# Patient Record
Sex: FEMALE | Race: WHITE | NOT HISPANIC OR LATINO | Employment: OTHER | URBAN - METROPOLITAN AREA
[De-identification: names, ages, dates, MRNs, and addresses within clinical notes are randomized per-mention and may not be internally consistent; named-entity substitution may affect disease eponyms.]

---

## 2018-10-11 ENCOUNTER — HOSPITAL ENCOUNTER (OUTPATIENT)
Dept: ORTHOPEDIC SURGERY | Facility: CLINIC | Age: 73
Discharge: HOME | End: 2018-10-11
Attending: ORTHOPAEDIC SURGERY | Admitting: ORTHOPAEDIC SURGERY

## 2020-07-02 ENCOUNTER — OFFICE VISIT (OUTPATIENT)
Dept: ORTHOPEDIC SURGERY | Facility: CLINIC | Age: 75
End: 2020-07-02

## 2020-07-02 VITALS
WEIGHT: 140 LBS | SYSTOLIC BLOOD PRESSURE: 146 MMHG | HEIGHT: 65 IN | HEART RATE: 81 BPM | DIASTOLIC BLOOD PRESSURE: 78 MMHG | BODY MASS INDEX: 23.32 KG/M2

## 2020-07-02 DIAGNOSIS — M25.512 ACUTE PAIN OF LEFT SHOULDER: Primary | ICD-10-CM

## 2020-07-02 PROCEDURE — 99213 OFFICE O/P EST LOW 20 MIN: CPT | Performed by: ORTHOPAEDIC SURGERY

## 2020-07-02 RX ORDER — MULTIPLE VITAMINS W/ MINERALS TAB 9MG-400MCG
1 TAB ORAL DAILY
COMMUNITY

## 2020-07-02 RX ORDER — NICOTINE POLACRILEX 4 MG/1
GUM, CHEWING ORAL
COMMUNITY
Start: 2018-10-11

## 2020-07-02 NOTE — PROGRESS NOTES
"     Patient ID: Ni Vance is a 75 y.o. female.    Chief Complaint:    Chief Complaint   Patient presents with   • Left Shoulder - Consult       HPI:  Ni is a 75-year-old female here with several months of left shoulder pain.  She denies specific injury but has developed significant pain and loss of range of motion in the shoulder.  Pain is deep in the shoulder.  It hurts to lift overhead and sleep at night.  She has tried to stretch it, use ice and anti-inflammatories without relief.  Pain is sharp and a 7/10  Past Medical History:   Diagnosis Date   • Allergies    • Asthma    • Kidney stones        Past Surgical History:   Procedure Laterality Date   • CHOLECYSTECTOMY     • TONSILLECTOMY         History reviewed. No pertinent family history.       Social History     Occupational History   • Not on file   Tobacco Use   • Smoking status: Never Smoker   Substance and Sexual Activity   • Alcohol use: Never     Frequency: Never   • Drug use: Not on file   • Sexual activity: Not on file      Review of Systems   Cardiovascular: Negative for chest pain.   Musculoskeletal: Positive for arthralgias.       Objective:    /78   Pulse 81   Ht 165.1 cm (65\")   Wt 63.5 kg (140 lb)   BMI 23.30 kg/m²     Physical Examination:  She is a pleasant female in no distress. She is alert and oriented x3 and appears her stated age.  Left shoulder demonstrates no scars and no atrophy.  There is diffuse tenderness over the bicep groove and impingement area.  Passive elevation is 110 degrees abduction 60 degrees external rotation 20 degrees internal rotation is the left hip.  She has pain and weakness on Speed, Adria, supraspinatus testing.  Belly press and liftoff are 5/5.Sensory and motor exam are intact all distributions. Radial pulse is palpable and capillary refill is less than two seconds to all digits    Imaging:  X-rays demonstrate well-maintained joint space with sclerosis at the coracoid base    Assessment:  Left " shoulder pain and stiffness with bone lesions of the scapula    Plan:  I recommend MRI to evaluate her shoulder for cuff tear versus frozen shoulder as well as the bone island found on x-ray, see me after imaging          Disclaimer: Please note that areas of this note were completed with computer voice recognition software.  Quite often unanticipated grammatical, syntax, homophones, and other interpretive errors are inadvertently transcribed by the computer software. Please excuse any errors that have escaped final proofreading.

## 2020-07-27 ENCOUNTER — OFFICE VISIT (OUTPATIENT)
Dept: ORTHOPEDIC SURGERY | Facility: CLINIC | Age: 75
End: 2020-07-27

## 2020-07-27 VITALS
HEIGHT: 65 IN | SYSTOLIC BLOOD PRESSURE: 147 MMHG | WEIGHT: 140 LBS | DIASTOLIC BLOOD PRESSURE: 80 MMHG | BODY MASS INDEX: 23.32 KG/M2 | HEART RATE: 75 BPM

## 2020-07-27 DIAGNOSIS — M75.02 ADHESIVE CAPSULITIS OF LEFT SHOULDER: Primary | ICD-10-CM

## 2020-07-27 PROCEDURE — 99213 OFFICE O/P EST LOW 20 MIN: CPT | Performed by: ORTHOPAEDIC SURGERY

## 2020-07-27 PROCEDURE — 20610 DRAIN/INJ JOINT/BURSA W/O US: CPT | Performed by: ORTHOPAEDIC SURGERY

## 2020-07-27 RX ORDER — TRIAMCINOLONE ACETONIDE 40 MG/ML
80 INJECTION, SUSPENSION INTRA-ARTICULAR; INTRAMUSCULAR ONCE
Status: COMPLETED | OUTPATIENT
Start: 2020-07-27 | End: 2020-07-27

## 2020-07-27 RX ADMIN — TRIAMCINOLONE ACETONIDE 80 MG: 40 INJECTION, SUSPENSION INTRA-ARTICULAR; INTRAMUSCULAR at 09:01

## 2020-07-27 NOTE — PROGRESS NOTES
Patient ID: Ni Vance is a 75 y.o. female.  Left shoulder pain  Ni is a 75-year-old female here with continued left shoulder pain and arm pain.  It hurts over the bicep groove and sometimes pain goes to the neck into the hand    Review of Systems:  Left shoulder pain  Denies chest pain      Objective:    There were no vitals taken for this visit.    Physical Examination:   She is a pleasant female in no distress. She is alert and oriented x3 and appears her stated age.  Left shoulder demonstrates no scars and no atrophy.  There is diffuse tenderness over the bicep groove and impingement area.  Passive elevation is 110 degrees abduction 60 degrees external rotation 20 degrees internal rotation is the left hip.  She has pain and weakness on Speed, Adria, supraspinatus testing.  Belly press and liftoff are 5/5.Sensory and motor exam are intact all distributions. 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 Risks and benefits of the injection were discussed. Under sterile technique and written consent I injected 80mg of Kenalog and 2cc of 1% Lidocaine in the shoulder and subacromial space. It was well tolerated          Disclaimer: Please note that areas of this note were completed with computer voice recognition software.  Quite often unanticipated grammatical, syntax, homophones, and other interpretive errors are inadvertently transcribed by the computer software. Please excuse any errors that have escaped final proofreading.

## 2020-08-03 ENCOUNTER — TREATMENT (OUTPATIENT)
Dept: PHYSICAL THERAPY | Facility: CLINIC | Age: 75
End: 2020-08-03

## 2020-08-03 DIAGNOSIS — R68.89 ACTIVITY INTOLERANCE: ICD-10-CM

## 2020-08-03 DIAGNOSIS — M75.02 ADHESIVE BURSITIS OF LEFT SHOULDER: Primary | ICD-10-CM

## 2020-08-03 PROCEDURE — 97161 PT EVAL LOW COMPLEX 20 MIN: CPT | Performed by: PHYSICAL THERAPIST

## 2020-08-03 PROCEDURE — 97110 THERAPEUTIC EXERCISES: CPT | Performed by: PHYSICAL THERAPIST

## 2020-08-03 PROCEDURE — 97140 MANUAL THERAPY 1/> REGIONS: CPT | Performed by: PHYSICAL THERAPIST

## 2020-08-03 PROCEDURE — 97535 SELF CARE MNGMENT TRAINING: CPT | Performed by: PHYSICAL THERAPIST

## 2020-08-03 NOTE — PROGRESS NOTES
Physical Therapy Initial Evaluation and Plan of Care    Patient: Ni Vance   : 1945  Diagnosis/ICD-10 Code:  Adhesive bursitis of left shoulder [M75.02]  Referring practitioner: Steven Arreguin, *  Date of Initial Visit: 8/3/2020  Today's Date: 8/3/2020  Patient seen for 1 sessions           Subjective Questionnaire: QuickDASH: 68.2% disability      Subjective Evaluation    History of Present Illness  Mechanism of injury: Pt is a 75 yr/o female that presents w/adhesive capsulitis of the (L) shoulder. Pt has been dealing with this condition since 2020 and it has progressively gotten worse. Pt received cortisone shot in (L) shoulder on the . It initially got worse after the shot but she states that it has gotten better since last week. Pt does not have a follow up scheduled w/Dr. Arreguin as of right now. He wants her to do therapy for a bit to see if condition improves.    PMH remarkable for: arthritis, osteoporosis    Quality of life: good    Pain  Current pain ratin  At best pain rating: 3  At worst pain rating: 10  Quality: sharp, dull ache, tight and pressure (Sharp shooting when it hits, but most often it is achey)  Relieving factors: rest (Hot shower)  Aggravating factors: repetitive movement, sleeping, overhead activity and movement    Social Support  Lives with: spouse    Hand dominance: right    Diagnostic Tests  X-ray: abnormal (X-rays demonstrate well-maintained joint space with sclerosis at the coracoid base)  MRI studies: abnormal (MRI demonstrates adhesive capsulitis with moderate arthritis)    Treatments  Previous treatment: injection treatment (Cortisone shot 20)  Patient Goals  Patient goals for therapy: decreased pain, increased motion, independence with ADLs/IADLs, increased strength and return to sport/leisure activities  Patient goal: Be able to do housework/gardening painfree, driving           Objective          Active Range of Motion   Left  Shoulder   Flexion: 90 degrees with pain  Abduction: 50 degrees   External rotation BTH: Active external rotation behind the head: upper trap/suprapsinatus muscle.   Internal rotation BTB: Active internal rotation behind the back: iliac crest.     Right Shoulder   Normal active range of motion    Left Elbow   Normal active range of motion    Right Elbow   Normal active range of motion    Passive Range of Motion   Left Shoulder   Flexion: 110 (Observed w/counter walkout exercise) degrees with pain  Abduction: 55 degrees with pain  External rotation 45°: 5 degrees with pain  Internal rotation 45°: WFL    Right Shoulder   Normal passive range of motion    Additional Passive Range of Motion Details  PROM difficult to assess d/t pt having a difficult time relaxing.    Joint Play   Left Shoulder  Hypomobile in the posterior capsule and inferior capsule.    Comments  Left inferior capsule comments: painful.     Strength/Myotome Testing     Left Shoulder     Planes of Motion   Flexion: 4- (Pain)   External rotation at 0°: 4   Internal rotation at 0°: 4 (pain)     Right Shoulder     Planes of Motion   Flexion: 4+   Abduction: 4   External rotation at 0°: 4+   Internal rotation at 0°: 4+     Left Elbow   Flexion: 4+  Extension: 4+    Right Elbow   Flexion: 4+  Extension: 4+    Additional Strength Details  Unable to assess (L) shoulder abduction strength d/t patient not being able to get into testing position.          Assessment & Plan     Assessment  Impairments: abnormal or restricted ROM, activity intolerance, impaired physical strength, lacks appropriate home exercise program, pain with function and weight-bearing intolerance  Assessment details: Pt is a 75 yr/o female that presents w/adhesive capsulitis of the (L) shoulder. Pt has been dealing with this condition since February 2020 and it has progressively gotten worse. Per QuickDASH, pt reports 68.2% disability. Objective findings notable for decreased PROM w/manual  therapy; however, pt demonstrated increased shoulder flexion PROM w/counter walkout exercise. Pt was able to achieve 90 degrees of active (L) shoulder flexion; abduction was significantly decreased. MMT revealed decreased (L) shoulder abduction strength d/t pt not being able to get into testing position. (L) shoulder flexion and IR/ER were weaker than (R) UE; however, differences were not extreme. Joint play revealed hypomobility of posterior and inferior capsule of (L) shoulder. Pt educated on objective findings as well as biomechanics and anatomy of shoulder, pt w/good understanding. Pt given initial HEP, pt w/good understanding. OPPT recommended in order to address above impairments; increasing ROM, strength and functional ability of (L) shoulder.  Prognosis: good  Functional Limitations: carrying objects, lifting, sleeping, pulling, pushing, uncomfortable because of pain, reaching behind back, reaching overhead and unable to perform repetitive tasks  Goals  Plan Goals: STG (w/in 6 visits):  - Pt is (I) w/initial HEP.  - Per QuickDASH, pt reports less than 55% disability.  - Decrease worst pain in (L) shoulder to 6/10 or less.  - Successfully dress herself w/o additional help from  w/no increase in pain in (L) shoulder.    LTG (by D/C):  - Pt is (I) w/final HEP.  - Per QuickDASH, pt reports less than 20% disability.  - Able to garden w/o any increased pain in (L) shoulder.  - Able to successfully complete chores around house w/no increased pain in (L) shoulder 4/5 times during the week.  - Pt is able to drive herself w/o any pain in (L) shoulder.    Plan  Therapy options: will be seen for skilled physical therapy services  Planned modality interventions: cryotherapy, electrical stimulation/Russian stimulation, thermotherapy (hydrocollator packs) and ultrasound  Planned therapy interventions: ADL retraining, flexibility, functional ROM exercises, home exercise program, joint mobilization, manual therapy,  neuromuscular re-education, soft tissue mobilization, strengthening, stretching, therapeutic activities and fine motor coordination training  Frequency: 2x week  Duration in visits: 20  Treatment plan discussed with: patient        Timed:         Manual Therapy:    8     mins  83132;     Therapeutic Exercise:    10     mins  35112;     Neuromuscular Wilbert:        mins  85942;    Therapeutic Activity:          mins  92450;     Gait Training:           mins  97425;     Ultrasound:          mins  42778;    Ionto                                   mins   15342  Self Care                       12     mins   45992  Canalith Repos         mins 58671      Un-Timed:  Electrical Stimulation:         mins  13744 ( );  Traction          mins 03244  Low Eval     26     Mins  66273  Mod Eval          Mins  93937  High Eval                            Mins  12657  Re-Eval                               mins  51086        Timed Treatment:   30   mins   Total Treatment:     56   mins    PT SIGNATURE: Janny Jimenez PT   DATE TREATMENT INITIATED: 8/3/2020    Initial Certification  Certification Period: 11/1/2020  I certify that the therapy services are furnished while this patient is under my care.  The services outlined above are required by this patient, and will be reviewed every 90 days.     PHYSICIAN: Steven Arreguin MD      DATE:     Please sign and return via fax to [phone].. Thank you, [facility].

## 2020-08-05 ENCOUNTER — TREATMENT (OUTPATIENT)
Dept: PHYSICAL THERAPY | Facility: CLINIC | Age: 75
End: 2020-08-05

## 2020-08-05 DIAGNOSIS — R68.89 ACTIVITY INTOLERANCE: ICD-10-CM

## 2020-08-05 DIAGNOSIS — M75.02 ADHESIVE BURSITIS OF LEFT SHOULDER: Primary | ICD-10-CM

## 2020-08-05 PROCEDURE — 97140 MANUAL THERAPY 1/> REGIONS: CPT | Performed by: PHYSICAL THERAPIST

## 2020-08-05 PROCEDURE — G0283 ELEC STIM OTHER THAN WOUND: HCPCS | Performed by: PHYSICAL THERAPIST

## 2020-08-05 PROCEDURE — 97110 THERAPEUTIC EXERCISES: CPT | Performed by: PHYSICAL THERAPIST

## 2020-08-05 NOTE — PROGRESS NOTES
Physical Therapy Daily Progress Note    VISIT#: 2    Subjective   Ni Vance reports: Shoulder was throbbing upon arrival for tx. Did not take any ibuprofen before tx today. Stim and ice decreased pain pre-PROM.       Objective     See Exercise, Manual, and Modality Logs for complete treatment.     Patient Education: Relationship b/w exercises and PROM    Assessment/Plan     Ice and e-stim modalities initiated at beginning of tx to decrease pain and help pt relax; pt stewart this well. PROM was improved from initial evaluation; however, pt still struggles to relax. Counter walk-out's performed and pt was able to get about 110 degrees of shoulder flexion ROM. SPT educated pt on how we should be able to get that much ROM in her (L) shoulder w/PROM, pt w/good understanding.      Progress per Plan of Care            Timed:         Manual Therapy:    20     mins  62880;     Therapeutic Exercise:    8     mins  56108;     Neuromuscular Wilbert:        mins  58042;    Therapeutic Activity:          mins  77441;     Gait Training:           mins  60805;     Ultrasound:          mins  17171;    Ionto                                   mins   21090  Self Care                            mins   71974  Canalith Repos                   mins  04586    Un-Timed:  Electrical Stimulation:    15     mins  94453 ( );  Traction          mins 64936  Low Eval          Mins  09380  Mod Eval          Mins  25834  High Eval                            Mins  21540  Re-Eval                               mins  01391    Timed Treatment:   28   mins   Total Treatment:     43   mins    Janny Jimenez PT    Physical Therapist

## 2020-08-10 ENCOUNTER — TREATMENT (OUTPATIENT)
Dept: PHYSICAL THERAPY | Facility: CLINIC | Age: 75
End: 2020-08-10

## 2020-08-10 DIAGNOSIS — M75.02 ADHESIVE BURSITIS OF LEFT SHOULDER: Primary | ICD-10-CM

## 2020-08-10 DIAGNOSIS — R68.89 ACTIVITY INTOLERANCE: ICD-10-CM

## 2020-08-10 PROCEDURE — G0283 ELEC STIM OTHER THAN WOUND: HCPCS | Performed by: PHYSICAL THERAPIST

## 2020-08-10 PROCEDURE — 97110 THERAPEUTIC EXERCISES: CPT | Performed by: PHYSICAL THERAPIST

## 2020-08-10 PROCEDURE — 97140 MANUAL THERAPY 1/> REGIONS: CPT | Performed by: PHYSICAL THERAPIST

## 2020-08-10 NOTE — PROGRESS NOTES
Physical Therapy Daily Progress Note    VISIT#: 3    Subjective   Ni Vance reports: Shoulder is doing better than last week. Said she has pushed it during her exercises at home and has been a little more sore recently; however, she is able to move it in a greater range.      Objective     See Exercise, Manual, and Modality Logs for complete treatment.     Patient Education: pulley system you can buy and use at home    Assessment/Plan     Patient was able to relax a lot more during the tx session today, allowing more PROM to be achieved than last session. Octavia's initiated to help patient achieve a greater ROM in (R) shoulder; pt was able to get about 130 degrees of flexion using the pulley's. Pt needed mod cueing during AAROM exercises w/alexi bar for proper positioning and motion of the bar during both flexion and abduction motion. Pt educated on benefit of purchasing a pulley system at home, pt w/good understanding. Pt encouraged to keep doing exercises at home in order to increase ROM of (R) shoulder, pt in agreement.      Progress per Plan of Care and Progress strengthening /stabilization /functional activity            Timed:         Manual Therapy:    20     mins  34112;     Therapeutic Exercise:    10     mins  01685;     Neuromuscular Wilbert:        mins  72977;    Therapeutic Activity:          mins  22618;     Gait Training:           mins  68734;     Ultrasound:          mins  97053;    Ionto                                   mins   90632  Self Care                       5     mins   05783  Canalith Repos                   mins  23950    Un-Timed:  Electrical Stimulation:    10     mins  58741 ( );  Traction          mins 90295  Dry Needle                 ______ mins DRYNDL  Low Eval          Mins  45347  Mod Eval          Mins  60353  High Eval                            Mins  69982  Re-Eval                               mins  08090    Timed Treatment:   35   mins   Total Treatment:     45    china Jimenez, PT    Physical Therapist

## 2020-08-13 ENCOUNTER — TREATMENT (OUTPATIENT)
Dept: PHYSICAL THERAPY | Facility: CLINIC | Age: 75
End: 2020-08-13

## 2020-08-13 DIAGNOSIS — R68.89 ACTIVITY INTOLERANCE: ICD-10-CM

## 2020-08-13 DIAGNOSIS — M75.02 ADHESIVE BURSITIS OF LEFT SHOULDER: Primary | ICD-10-CM

## 2020-08-13 PROCEDURE — 97140 MANUAL THERAPY 1/> REGIONS: CPT | Performed by: PHYSICAL THERAPIST

## 2020-08-13 PROCEDURE — 97110 THERAPEUTIC EXERCISES: CPT | Performed by: PHYSICAL THERAPIST

## 2020-08-13 PROCEDURE — G0283 ELEC STIM OTHER THAN WOUND: HCPCS | Performed by: PHYSICAL THERAPIST

## 2020-08-13 NOTE — PROGRESS NOTES
Physical Therapy Daily Progress Note    VISIT#: 4    Subjective   Ni Vance reports that she has had some soreness following her previous session, but feels that the soreness is just from her being able to move her arm more.   Pain Rating (0/10): 3    Objective     See Exercise, Manual, and Modality Logs for complete treatment.     Patient Education: Pt brought in her home TENS unit and was instructed on how to hook up and operate her home TENS unit.     Assessment/Plan     Pt brought in her home TENS unit and was instructed on how to hook up and operate her home TENS unit. Pt demonstrated good understanding of how to hook up and operate her home TENS unit. PROM was continued today with pt continuing to demonstrate limitations. Wall slides into flexion added today with pt instructed to prevent back extension while trying to gain more flexion to prevent back injury.       Plan  Progress per Plan of Care and Progress strengthening /stabilization /functional activity            Timed:         Manual Therapy:    15     mins  19006;     Therapeutic Exercise:    20     mins  11615;     Neuromuscular Wilbert:        mins  05130;    Therapeutic Activity:          mins  47888;     Gait Training:           mins  84036;     Ultrasound:          mins  69191;    Ionto                                   mins   14912  Self Care                            mins   62382    Un-Timed:  Electrical Stimulation:    15     mins  45326 ( );  Dry Needling          mins self-pay  Traction          mins 44625  Low Eval          Mins  38629  Mod Eval          Mins  22625  High Eval                            Mins  05117  Re-Eval                               mins  88717    Timed Treatment:   50   mins   Total Treatment:     50   mins    Lay Reyes PT, DPT  Physical Therapist

## 2020-08-20 ENCOUNTER — TREATMENT (OUTPATIENT)
Dept: PHYSICAL THERAPY | Facility: CLINIC | Age: 75
End: 2020-08-20

## 2020-08-20 DIAGNOSIS — M75.02 ADHESIVE BURSITIS OF LEFT SHOULDER: Primary | ICD-10-CM

## 2020-08-20 DIAGNOSIS — R68.89 ACTIVITY INTOLERANCE: ICD-10-CM

## 2020-08-20 PROCEDURE — 97110 THERAPEUTIC EXERCISES: CPT | Performed by: PHYSICAL THERAPIST

## 2020-08-20 PROCEDURE — 97140 MANUAL THERAPY 1/> REGIONS: CPT | Performed by: PHYSICAL THERAPIST

## 2020-08-20 PROCEDURE — G0283 ELEC STIM OTHER THAN WOUND: HCPCS | Performed by: PHYSICAL THERAPIST

## 2020-08-20 NOTE — PROGRESS NOTES
Physical Therapy Daily Progress Note    VISIT#: 5    Subjective   Ni Vance reports: Knee is bothering her today, does this every now and then. Shoulder is getting much better but she still cant raise it as much as she wants and it does still pain down the arm but it is better.       Objective     See Exercise, Manual, and Modality Logs for complete treatment.   AAROM: flex 132, abd 108 deg, ER 25 deg, IR 40 deg   Add: sleeper stretch, head shaver ER AAROM, swiss ball rolls on table     Assessment/Plan  Pt with improved motion after additional manual for capsular stretching, as well as addition of sleeper stretch and head shaver ER stretch. Issued HEP progression. Modalities for pain relief, utilized heat for additional muscle relaxation today with good tolerance.     Progress per Plan of Care and Progress strengthening /stabilization /functional activity            Timed:         Manual Therapy:    20     mins  91673;     Therapeutic Exercise:    17     mins  50698;     Neuromuscular Wilbert:        mins  32796;    Therapeutic Activity:          mins  11266;     Gait Training:           mins  27312;     Ultrasound:          mins  52963;    Ionto                                   mins   75883  Self Care                            mins   91467  Canalith Repos                   mins  19572    Un-Timed:  Electrical Stimulation:    15     mins  04258 ( );  Traction          mins 13021  Dry Needle                 ______ mins DRYNDL  Low Eval          Mins  02100  Mod Eval          Mins  80458  High Eval                            Mins  44569  Re-Eval                               mins  84107    Timed Treatment:   37   mins   Total Treatment:     46   mins    Janny Jimenez PT    Physical Therapist

## 2020-08-26 ENCOUNTER — TREATMENT (OUTPATIENT)
Dept: PHYSICAL THERAPY | Facility: CLINIC | Age: 75
End: 2020-08-26

## 2020-08-26 DIAGNOSIS — M75.02 ADHESIVE BURSITIS OF LEFT SHOULDER: Primary | ICD-10-CM

## 2020-08-26 PROCEDURE — 97140 MANUAL THERAPY 1/> REGIONS: CPT | Performed by: PHYSICAL THERAPIST

## 2020-08-26 PROCEDURE — 97110 THERAPEUTIC EXERCISES: CPT | Performed by: PHYSICAL THERAPIST

## 2020-08-26 NOTE — PROGRESS NOTES
Physical Therapy Daily Progress Note    VISIT#: 6    Subjective   Ni Vance reports: No pain currently, took pain pill an hour ago.  Notes PT helping, able to pull up her pants and reach neck now.      Objective     See Exercise, Manual, and Modality Logs for complete treatment.     Patient Education:    Assessment/Plan:  Held IFES as pt. Has TENS unit. Tactile and verbal cueing required to perform exercises correctly and for appropriate stretch.       Progress per Plan of Care            Timed:         Manual Therapy: 20        mins  93032;     Therapeutic Exercise:   25      mins  01569;     Neuromuscular Wilbert:        mins  56867;    Therapeutic Activity:          mins  47660;     Gait Training:           mins  47684;     Ultrasound:          mins  57775;    Ionto                                   mins   66721  Self Care                            mins   11826  Canalith Repos                   mins  4209  Aquatic                               mins 21934    Un-Timed:  Electrical Stimulation:         mins  38503 ( );  Dry Needling          mins self-pay  Traction          mins 43781  Low Eval          Mins  06473  Mod Eval          Mins  39762  High Eval                            Mins  64378  Re-Eval                               mins  31970    Timed Treatment:  45  mins   Total Treatment:     45   mins    Jeanne Amaya, PTA

## 2020-08-28 ENCOUNTER — TREATMENT (OUTPATIENT)
Dept: PHYSICAL THERAPY | Facility: CLINIC | Age: 75
End: 2020-08-28

## 2020-08-28 DIAGNOSIS — R68.89 ACTIVITY INTOLERANCE: ICD-10-CM

## 2020-08-28 DIAGNOSIS — M75.02 ADHESIVE BURSITIS OF LEFT SHOULDER: Primary | ICD-10-CM

## 2020-08-28 PROCEDURE — 97530 THERAPEUTIC ACTIVITIES: CPT | Performed by: PHYSICAL THERAPIST

## 2020-08-28 PROCEDURE — G0283 ELEC STIM OTHER THAN WOUND: HCPCS | Performed by: PHYSICAL THERAPIST

## 2020-08-28 PROCEDURE — 97110 THERAPEUTIC EXERCISES: CPT | Performed by: PHYSICAL THERAPIST

## 2020-08-28 PROCEDURE — 97140 MANUAL THERAPY 1/> REGIONS: CPT | Performed by: PHYSICAL THERAPIST

## 2020-08-28 NOTE — PROGRESS NOTES
Physical Therapy Daily Progress Note    VISIT#: 7    Subjective   Ni Vance reports: Shoulder has been sore since last session. Not too much pain, just soreness w/moving it.    Subjective Questionnaire: 46.6%    Objective          Active Range of Motion   Left Shoulder   Flexion: 119 (pain at end range) degrees with pain  Abduction: 105 (pain at end range) degrees with pain  External rotation BTH: T3 with pain  Internal rotation BTB: L3       Added: Arm bike    See Exercise, Manual, and Modality Logs for complete treatment    Assessment/Plan     Per DASH, pt reports 46.6% impairment, which is a 22% improvement from initial evaluation. Pt has met all STG established at initial evaluation, as shown below. Pt has improved AROM of her (L) shoulder by 30 and 65 degrees for flexion and abduction, respectively. Pt stewart addition of arm bike well, no increased pain in shoulder, just soreness. Pt is progressing very nicely, she is getting better each session. Pt educated on findings, pt w/good understanding.    Goals  Plan Goals: STG (w/in 6 visits):  - Pt is (I) w/initial HEP - MET 8/28/2020  - Per QuickDASH, pt reports less than 55% disability - MET 8/28/2020  - Decrease worst pain in (L) shoulder to 6/10 or less - MET 8/28/2020  - Successfully dress herself w/o additional help from  w/no increase in pain in (L) shoulder - MET 8/28/2020    LTG (by D/C):  - Pt is (I) w/final HEP.  - Per QuickDASH, pt reports less than 20% disability.  - Able to garden w/o any increased pain in (L) shoulder.  - Able to successfully complete chores around house w/no increased pain in (L) shoulder 4/5 times during the week.  - Pt is able to drive herself w/o any pain in (L) shoulder.    Progress per Plan of Care and Progress strengthening /stabilization /functional activity            Timed:         Manual Therapy:    10     mins  38487;     Therapeutic Exercise:     10    mins  40245;     Neuromuscular Wilbert:        mins  63030;     Therapeutic Activity:     20     mins  60294;     Gait Training:           mins  44689;     Ultrasound:          mins  77653;    Ionto                                   mins   19939  Self Care                            mins   24110  Canalith Repos                   mins  76433    Un-Timed:  Electrical Stimulation:    15     mins  26890 ( );  Traction          mins 49192  Dry Needle                 ______ mins DRYNDL  Low Eval          Mins  19340  Mod Eval          Mins  92978  High Eval                            Mins  80156  Re-Eval                               mins  42488    Timed Treatment:   40   mins   Total Treatment:     58   mins    Janny Jimenez PT    Physical Therapist

## 2020-08-31 ENCOUNTER — TREATMENT (OUTPATIENT)
Dept: PHYSICAL THERAPY | Facility: CLINIC | Age: 75
End: 2020-08-31

## 2020-08-31 DIAGNOSIS — M75.02 ADHESIVE BURSITIS OF LEFT SHOULDER: Primary | ICD-10-CM

## 2020-08-31 PROCEDURE — G0283 ELEC STIM OTHER THAN WOUND: HCPCS | Performed by: PHYSICAL THERAPIST

## 2020-08-31 PROCEDURE — 97140 MANUAL THERAPY 1/> REGIONS: CPT | Performed by: PHYSICAL THERAPIST

## 2020-08-31 PROCEDURE — 97110 THERAPEUTIC EXERCISES: CPT | Performed by: PHYSICAL THERAPIST

## 2020-08-31 NOTE — PROGRESS NOTES
Physical Therapy Daily Progress Note    VISIT#: 8/20 in POC.  Authorization 7/12  PMH remarkable for: arthritis, osteoporosis    Subjective   Ni Vance reports: Overdid exercises yesterday so had bad night. Pain currently 2/10 L shoulder. Notes neck her really been bothering her on L side.       Objective     See Exercise, Manual, and Modality Logs for complete treatment.     Patient Education:  Hep progressed and issued, see chart.  Access Code: [email or web address]   URL: [email or web address]   Date: 08/31/2020   Prepared by: Jeanne Amaya     Exercises   Seated Upper Trapezius Stretch - 3 reps - 1 sets - 20 hold - 2x daily - 7x weekly   Gentle Levator Scapulae Stretch - 10 reps - 3 sets - 1x daily - 7x weekly       Assessment/Plan:  Pt. With trigger points L UT and L serratus.  Liked cervical stretches.       Progress per Plan of Care:  Monitor response to progression.            Timed:         Manual Therapy: 13        mins  95489;     Therapeutic Exercise:   17   mins  69918;     Neuromuscular Wilbert:        mins  58381;    Therapeutic Activity:          mins  96929;     Gait Training:           mins  07010;     Ultrasound:          mins  47444;    Ionto                                   mins   51459  Self Care                            mins   34092  Canalith Repos                   mins  4209  Aquatic                               mins 70303    Un-Timed:  Electrical Stimulation:    15     mins  79951 ( );  Dry Needling          mins self-pay  Traction          mins 01917  Low Eval          Mins  57580  Mod Eval          Mins  32390  High Eval                            Mins  78968  Re-Eval                               mins  14445    Timed Treatment:  45  mins   Total Treatment:     45   mins    Jeanne Amaya, PTA

## 2020-09-02 ENCOUNTER — TREATMENT (OUTPATIENT)
Dept: PHYSICAL THERAPY | Facility: CLINIC | Age: 75
End: 2020-09-02

## 2020-09-02 DIAGNOSIS — M75.02 ADHESIVE BURSITIS OF LEFT SHOULDER: Primary | ICD-10-CM

## 2020-09-02 PROCEDURE — 97140 MANUAL THERAPY 1/> REGIONS: CPT | Performed by: PHYSICAL THERAPIST

## 2020-09-02 PROCEDURE — 97110 THERAPEUTIC EXERCISES: CPT | Performed by: PHYSICAL THERAPIST

## 2020-09-02 PROCEDURE — G0283 ELEC STIM OTHER THAN WOUND: HCPCS | Performed by: PHYSICAL THERAPIST

## 2020-09-02 NOTE — PROGRESS NOTES
Physical Therapy Daily Progress Note    VISIT#: 9/20 in POC.  Authorization 8/12  PMH remarkable for: arthritis, osteoporosis    Subjective   Dan Vance reports: L side of neck sore from stretches 1/10.        Objective     See Exercise, Manual, and Modality Logs for complete treatment.         Assessment/Plan:  Reviewed cervical stretches and corrected as patient performing too aggressively.  Tension remains UT and serratus.       Progress per Plan of Care:              Timed:         Manual Therapy: 15        mins  48870;     Therapeutic Exercise:   15   mins  18322;     Neuromuscular Wilbert:        mins  21580;    Therapeutic Activity:          mins  80727;     Gait Training:           mins  35671;     Ultrasound:          mins  62553;    Ionto                                   mins   45755  Self Care                            mins   58241  Canalith Repos                   mins  4209  Aquatic                               mins 42554    Un-Timed:  Electrical Stimulation:    15     mins  74475 ( );  Dry Needling          mins self-pay  Traction          mins 80447  Low Eval          Mins  87873  Mod Eval          Mins  38048  High Eval                            Mins  85261  Re-Eval                               mins  62061    Timed Treatment:  30  mins   Total Treatment:     45   mins    Jeanne Amaya, PTA

## 2020-09-09 ENCOUNTER — TREATMENT (OUTPATIENT)
Dept: PHYSICAL THERAPY | Facility: CLINIC | Age: 75
End: 2020-09-09

## 2020-09-09 DIAGNOSIS — R68.89 ACTIVITY INTOLERANCE: ICD-10-CM

## 2020-09-09 DIAGNOSIS — M75.02 ADHESIVE BURSITIS OF LEFT SHOULDER: Primary | ICD-10-CM

## 2020-09-09 PROCEDURE — 97140 MANUAL THERAPY 1/> REGIONS: CPT | Performed by: PHYSICAL THERAPIST

## 2020-09-09 PROCEDURE — G0283 ELEC STIM OTHER THAN WOUND: HCPCS | Performed by: PHYSICAL THERAPIST

## 2020-09-09 PROCEDURE — 97110 THERAPEUTIC EXERCISES: CPT | Performed by: PHYSICAL THERAPIST

## 2020-09-09 PROCEDURE — 97530 THERAPEUTIC ACTIVITIES: CPT | Performed by: PHYSICAL THERAPIST

## 2020-09-09 NOTE — PROGRESS NOTES
Physical Therapy Daily Progress Note and Medicare Update     VISIT#: 10    Subjective   Ni Vance reports: Is doing better, but still limited in washing/drying her back and carrying things with the left hand/arm.     Objective     See Exercise, Manual, and Modality Logs for complete treatment.   AROM flex 139, abd 114, ER T2, IR L4 no pain with motion   DASH: 27% impairment   Patient Education: continuation of capsular stretching at home and pulleys     Assessment/Plan   Pt shows progress toward all LTGs today, however is still limited in multiple functional areas. Recommend continued skilled OPPT to address above limitations, pt in agreement. ROM is improved in all areas compared to last reassessment, capsular mobility limited in rotation and abduction. Initiated strength training with good stewart today.      STG (w/in 6 visits):  - Pt is (I) w/initial HEP - MET 8/28/2020  - Per [employer], pt reports less than 55% disability - MET 8/28/2020  - Decrease worst pain in (L) shoulder to 6/10 or less - MET 8/28/2020  - Successfully dress herself w/o additional help from  w/no increase in pain in (L) shoulder - MET 8/28/2020    LTG (by D/C):  - Pt is (I) w/final HEP.  - Per [employer], pt reports less than 20% disability. - 27% impairment 9/9   - Able to garden w/o any increased pain in (L) shoulder. - progressing 9/9  - Able to successfully complete chores around house w/no increased pain in (L) shoulder 4/5 times during the week. - progressing 9/9  - Pt is able to drive herself w/o any pain in (L) shoulder. - progressing 9/9      Progress per Plan of Care and Progress strengthening /stabilization /functional activity        Timed:         Manual Therapy:    14     mins  03573;     Therapeutic Exercise:    17     mins  58883;     Neuromuscular Wilbert:        mins  12321;    Therapeutic Activity:     10     mins  42351;     Gait Training:           mins  07049;     Ultrasound:          mins  06441;    Ionto                                    mins   79922  Self Care                            mins   57088  Canalith Repos                   mins  48030    Un-Timed:  Electrical Stimulation:    15     mins  22558 ( );  Traction          mins 09378  Dry Needle                 ______ mins DRYNDL  Low Eval          Mins  88177  Mod Eval          Mins  01143  High Eval                            Mins  45974  Re-Eval                              mins  81710    Timed Treatment:   41   mins   Total Treatment:     62   mins    Janny Jimenez PT    Physical Therapist

## 2020-09-11 ENCOUNTER — TREATMENT (OUTPATIENT)
Dept: PHYSICAL THERAPY | Facility: CLINIC | Age: 75
End: 2020-09-11

## 2020-09-11 DIAGNOSIS — R68.89 ACTIVITY INTOLERANCE: ICD-10-CM

## 2020-09-11 DIAGNOSIS — M75.02 ADHESIVE BURSITIS OF LEFT SHOULDER: Primary | ICD-10-CM

## 2020-09-11 PROCEDURE — 97110 THERAPEUTIC EXERCISES: CPT | Performed by: PHYSICAL THERAPIST

## 2020-09-11 PROCEDURE — 97140 MANUAL THERAPY 1/> REGIONS: CPT | Performed by: PHYSICAL THERAPIST

## 2020-09-11 PROCEDURE — G0283 ELEC STIM OTHER THAN WOUND: HCPCS | Performed by: PHYSICAL THERAPIST

## 2020-09-11 NOTE — PROGRESS NOTES
Physical Therapy Daily Progress Note    VISIT#: 11    Subjective   Ni Vance reports: Shoulder is a little sore but not bad, was able to help put dishes away using the left arm last night and is happy about that. Doing pulleys every day.     Objective     See Exercise, Manual, and Modality Logs for complete treatment.     Patient Education: add bands for scap strength at home     Assessment/Plan  Reviewed HEP as pt will be out of town for next two weeks. Pt with good understanding. Will check carryover and readiness for DC at next visit.     Progress per Plan of Care and Progress strengthening /stabilization /functional activity        Timed:         Manual Therapy:    14     mins  42009;     Therapeutic Exercise:    28     mins  92074;     Neuromuscular Wilbert:        mins  48287;    Therapeutic Activity:          mins  93247;     Gait Training:           mins  85761;     Ultrasound:          mins  10902;    Ionto                                   mins   53690  Self Care                            mins   37455  Canalith Repos                   mins  49044    Un-Timed:  Electrical Stimulation:    15     mins  69270 ( );  Traction          mins 37544  Dry Needle                 ______ mins DRYNDL  Low Eval          Mins  01588  Mod Eval          Mins  71545  High Eval                            Mins  08788  Re-Eval                               mins  74083    Timed Treatment:   42   mins   Total Treatment:     64   mins    Janny Jimenez, PT    Physical Therapist

## 2020-09-29 ENCOUNTER — TREATMENT (OUTPATIENT)
Dept: PHYSICAL THERAPY | Facility: CLINIC | Age: 75
End: 2020-09-29

## 2020-09-29 DIAGNOSIS — M75.02 ADHESIVE BURSITIS OF LEFT SHOULDER: Primary | ICD-10-CM

## 2020-09-29 DIAGNOSIS — R68.89 ACTIVITY INTOLERANCE: ICD-10-CM

## 2020-09-29 PROCEDURE — 97530 THERAPEUTIC ACTIVITIES: CPT | Performed by: PHYSICAL THERAPIST

## 2020-09-29 PROCEDURE — 97140 MANUAL THERAPY 1/> REGIONS: CPT | Performed by: PHYSICAL THERAPIST

## 2020-09-29 PROCEDURE — G0283 ELEC STIM OTHER THAN WOUND: HCPCS | Performed by: PHYSICAL THERAPIST

## 2020-09-29 NOTE — PROGRESS NOTES
Physical Therapy Daily Progress Note    VISIT#: 12    Subjective   Ni Vance reports: Thinks she regressed some while on vacation. Overall still thinks she is doing well still though.     Objective     See Exercise, Manual, and Modality Logs for complete treatment.   AROM - flex 140, abd 130, ER T3, IR L4 no pain  Add: IR towel stretch     Assessment/Plan  Pt with improved motion in abduction today compared to previous session despite being on vacation. Shows limitation with IR specifically, added towel stretch to help this direction. Progressed strength to green band for scapular HEP, pt with good understanding and motivation.     Progress per Plan of Care and Progress strengthening /stabilization /functional activity        Timed:         Manual Therapy:    14     mins  18758;     Therapeutic Exercise:         mins  15749;     Neuromuscular Wilbert:        mins  03702;    Therapeutic Activity:     20     mins  76036;     Gait Training:           mins  22785;     Ultrasound:          mins  22474;    Ionto                                  mins   37565  Self Care                            mins   92376  Canalith Repos                   mins  88779    Un-Timed:  Electrical Stimulation:    15     mins  62577 ( );  Traction          mins 09784  Dry Needle                 ______ mins DRYNDL  Low Eval          Mins  46598  Mod Eval          Mins  66907  High Eval                            Mins  93587  Re-Eval                               mins  76926    Timed Treatment:   34   mins   Total Treatment:     50   mins    Janny Jimenez PT    Physical Therapist

## 2020-10-09 ENCOUNTER — TREATMENT (OUTPATIENT)
Dept: PHYSICAL THERAPY | Facility: CLINIC | Age: 75
End: 2020-10-09

## 2020-10-09 DIAGNOSIS — M75.02 ADHESIVE BURSITIS OF LEFT SHOULDER: Primary | ICD-10-CM

## 2020-10-09 DIAGNOSIS — R68.89 ACTIVITY INTOLERANCE: ICD-10-CM

## 2020-10-09 PROCEDURE — G0283 ELEC STIM OTHER THAN WOUND: HCPCS | Performed by: PHYSICAL THERAPIST

## 2020-10-09 PROCEDURE — 97530 THERAPEUTIC ACTIVITIES: CPT | Performed by: PHYSICAL THERAPIST

## 2020-10-09 PROCEDURE — 97140 MANUAL THERAPY 1/> REGIONS: CPT | Performed by: PHYSICAL THERAPIST

## 2020-10-09 PROCEDURE — 97110 THERAPEUTIC EXERCISES: CPT | Performed by: PHYSICAL THERAPIST

## 2020-10-09 NOTE — PROGRESS NOTES
Physical Therapy Daily Progress Note    VISIT#: 13    Subjective   Ni Vance reports: Shoulder was very sore after helping her  with a project in the garage where was helping to hold metal sheets in the garage about a week ago. Reporting she held her arm above her head for quite a long time and was sore for 2 days.       Objective     See Exercise, Manual, and Modality Logs for complete treatment.     Patient Education: continue strength at home     Assessment/Plan  Added shoulder flexion and punch to strength ex with band for HEP. Pt with good understanding. Good progress with ROM and strength.     Progress per Plan of Care        Timed:         Manual Therapy:    14     mins  71425;     Therapeutic Exercise:    12     mins  35537;     Neuromuscular Wilbert:        mins  28565;    Therapeutic Activity:     17     mins  86345;     Gait Training:           mins  13455;     Ultrasound:          mins  15082;    Ionto                                   mins   44452  Self Care                            mins   11155  Canalith Repos                   mins  87016    Un-Timed:  Electrical Stimulation:     10    mins  46709 ( );  Traction          mins 22622  Dry Needle                 ______ mins DRYNDL  Low Eval          Mins  31331  Mod Eval          Mins  20609  High Eval                            Mins  26012  Re-Eval                               mins  31037    Timed Treatment:   43   mins   Total Treatment:     55   mins    Janny Jimenez PT    Physical Therapist

## 2020-11-12 ENCOUNTER — DOCUMENTATION (OUTPATIENT)
Dept: PHYSICAL THERAPY | Facility: CLINIC | Age: 75
End: 2020-11-12

## 2020-11-12 NOTE — PROGRESS NOTES
Discharge Summary  Discharge Summary from Physical Therapy Report      Dates  PT visit: 8/3/2020-10/9/2020  Number of Visits: 13    Goals: All Met    Discharge Plan: Continue with current home exercise program as instructed    Comments : Pt did not schedule further sessions d/t insurance coverage concerns, however now POC has  therefore pt appropriate for DC at this time. Pt not present for discharge, therefore no functional measures taken. See last note for most updated information.      Date of Discharge 2020        Janny Jimenez, PT  Physical Therapist

## 2021-03-30 ENCOUNTER — TELEPHONE (OUTPATIENT)
Dept: ORTHOPEDIC SURGERY | Facility: CLINIC | Age: 76
End: 2021-03-30

## 2021-03-30 NOTE — TELEPHONE ENCOUNTER
Provider: BRIEN  Caller: MRS ALEGRE   Relationship to Patient: PATIENT   Phone Number: [phone]    Reason for Call: PATIENT CALLED IN WANTING TO SEE IF DR TESFAYE WILL GO ON AND START THE PROCESS OF GETTING PATIENT APPROVED FOR GEL INJ FOR HER RIGHT KNEE. PATIENT PREV SEEN DR TESFAYE BACK IN OCT 2018, THEY DID CORTISONE INJ HELPED FOR A BIT  BUT NOW THE PAIN IS BACK SO PATIENT IS WANTING TO SEE IF SHE CAN DO THE GEL INJ. PLEASE GIVE PATIENT A CALL BACK REGARDING THIS AT THE ABOVE.

## 2021-03-31 NOTE — TELEPHONE ENCOUNTER
Called patient LVM for her letting her know that we would need to schedule and appointment for her knee since we have not seen her for it since 2018.

## 2022-06-20 ENCOUNTER — LAB (OUTPATIENT)
Dept: LAB | Facility: HOSPITAL | Age: 77
End: 2022-06-20

## 2022-06-20 ENCOUNTER — TRANSCRIBE ORDERS (OUTPATIENT)
Dept: ADMINISTRATIVE | Facility: HOSPITAL | Age: 77
End: 2022-06-20

## 2022-06-20 DIAGNOSIS — U09.9 PERSISTENT NEUROLOGIC SYMPTOMS AFTER COVID-19: Primary | ICD-10-CM

## 2022-06-20 DIAGNOSIS — R29.90 PERSISTENT NEUROLOGIC SYMPTOMS AFTER COVID-19: Primary | ICD-10-CM

## 2022-06-20 DIAGNOSIS — R29.90 PERSISTENT NEUROLOGIC SYMPTOMS AFTER COVID-19: ICD-10-CM

## 2022-06-20 DIAGNOSIS — U09.9 PERSISTENT NEUROLOGIC SYMPTOMS AFTER COVID-19: ICD-10-CM

## 2022-06-20 PROCEDURE — U0004 COV-19 TEST NON-CDC HGH THRU: HCPCS

## 2022-06-20 PROCEDURE — C9803 HOPD COVID-19 SPEC COLLECT: HCPCS

## 2022-06-21 LAB — SARS-COV-2 ORF1AB RESP QL NAA+PROBE: NOT DETECTED

## 2023-04-06 ENCOUNTER — OFFICE (AMBULATORY)
Dept: URBAN - METROPOLITAN AREA CLINIC 64 | Facility: CLINIC | Age: 78
End: 2023-04-06
Payer: COMMERCIAL

## 2023-04-06 VITALS
DIASTOLIC BLOOD PRESSURE: 83 MMHG | SYSTOLIC BLOOD PRESSURE: 139 MMHG | HEIGHT: 64 IN | HEART RATE: 76 BPM | WEIGHT: 138 LBS

## 2023-04-06 DIAGNOSIS — R10.10 UPPER ABDOMINAL PAIN, UNSPECIFIED: ICD-10-CM

## 2023-04-06 DIAGNOSIS — K21.9 GASTRO-ESOPHAGEAL REFLUX DISEASE WITHOUT ESOPHAGITIS: ICD-10-CM

## 2023-04-06 DIAGNOSIS — R10.13 EPIGASTRIC PAIN: ICD-10-CM

## 2023-04-06 DIAGNOSIS — R13.10 DYSPHAGIA, UNSPECIFIED: ICD-10-CM

## 2023-04-06 PROCEDURE — 99203 OFFICE O/P NEW LOW 30 MIN: CPT

## 2023-04-10 ENCOUNTER — HOSPITAL ENCOUNTER (EMERGENCY)
Facility: HOSPITAL | Age: 78
Discharge: HOME | End: 2023-04-10
Attending: EMERGENCY MEDICINE | Admitting: EMERGENCY MEDICINE
Payer: MEDICARE

## 2023-04-10 ENCOUNTER — APPOINTMENT (OUTPATIENT)
Dept: CT IMAGING | Facility: HOSPITAL | Age: 78
End: 2023-04-10
Payer: MEDICARE

## 2023-04-10 VITALS
HEIGHT: 64 IN | RESPIRATION RATE: 18 BRPM | WEIGHT: 139 LBS | HEART RATE: 73 BPM | SYSTOLIC BLOOD PRESSURE: 108 MMHG | BODY MASS INDEX: 23.73 KG/M2 | DIASTOLIC BLOOD PRESSURE: 61 MMHG | OXYGEN SATURATION: 97 % | TEMPERATURE: 98 F

## 2023-04-10 DIAGNOSIS — S22.000A COMPRESSION FRACTURE OF THORACIC VERTEBRA, UNSPECIFIED THORACIC VERTEBRAL LEVEL, INITIAL ENCOUNTER: ICD-10-CM

## 2023-04-10 DIAGNOSIS — S32.000A COMPRESSION FRACTURE OF LUMBAR VERTEBRA, UNSPECIFIED LUMBAR VERTEBRAL LEVEL, INITIAL ENCOUNTER: ICD-10-CM

## 2023-04-10 DIAGNOSIS — R10.10 PAIN OF UPPER ABDOMEN: Primary | ICD-10-CM

## 2023-04-10 DIAGNOSIS — R91.1 PULMONARY NODULE: ICD-10-CM

## 2023-04-10 LAB
ALBUMIN SERPL-MCNC: 3.8 G/DL (ref 3.5–5.2)
ALBUMIN/GLOB SERPL: 1.4 G/DL
ALP SERPL-CCNC: 85 U/L (ref 39–117)
ALT SERPL W P-5'-P-CCNC: 11 U/L (ref 1–33)
ANION GAP SERPL CALCULATED.3IONS-SCNC: 10 MMOL/L (ref 5–15)
AST SERPL-CCNC: 17 U/L (ref 1–32)
BACTERIA UR QL AUTO: ABNORMAL /HPF
BASOPHILS # BLD AUTO: 0 10*3/MM3 (ref 0–0.2)
BASOPHILS NFR BLD AUTO: 0.4 % (ref 0–1.5)
BILIRUB SERPL-MCNC: 0.2 MG/DL (ref 0–1.2)
BILIRUB UR QL STRIP: NEGATIVE
BUN SERPL-MCNC: 21 MG/DL (ref 8–23)
BUN/CREAT SERPL: 20 (ref 7–25)
CALCIUM SPEC-SCNC: 9.1 MG/DL (ref 8.6–10.5)
CHLORIDE SERPL-SCNC: 103 MMOL/L (ref 98–107)
CLARITY UR: CLEAR
CO2 SERPL-SCNC: 24 MMOL/L (ref 22–29)
COLOR UR: YELLOW
CREAT SERPL-MCNC: 1.05 MG/DL (ref 0.57–1)
DEPRECATED RDW RBC AUTO: 44.6 FL (ref 37–54)
EGFRCR SERPLBLD CKD-EPI 2021: 54.5 ML/MIN/1.73
EOSINOPHIL # BLD AUTO: 0.2 10*3/MM3 (ref 0–0.4)
EOSINOPHIL NFR BLD AUTO: 2.2 % (ref 0.3–6.2)
ERYTHROCYTE [DISTWIDTH] IN BLOOD BY AUTOMATED COUNT: 13.6 % (ref 12.3–15.4)
GLOBULIN UR ELPH-MCNC: 2.7 GM/DL
GLUCOSE SERPL-MCNC: 96 MG/DL (ref 65–99)
GLUCOSE UR STRIP-MCNC: NEGATIVE MG/DL
HCT VFR BLD AUTO: 42.5 % (ref 34–46.6)
HGB BLD-MCNC: 13.6 G/DL (ref 12–15.9)
HGB UR QL STRIP.AUTO: NEGATIVE
HYALINE CASTS UR QL AUTO: ABNORMAL /LPF
KETONES UR QL STRIP: NEGATIVE
LEUKOCYTE ESTERASE UR QL STRIP.AUTO: ABNORMAL
LIPASE SERPL-CCNC: 47 U/L (ref 13–60)
LYMPHOCYTES # BLD AUTO: 1.3 10*3/MM3 (ref 0.7–3.1)
LYMPHOCYTES NFR BLD AUTO: 18.2 % (ref 19.6–45.3)
MCH RBC QN AUTO: 30.2 PG (ref 26.6–33)
MCHC RBC AUTO-ENTMCNC: 32.1 G/DL (ref 31.5–35.7)
MCV RBC AUTO: 94.1 FL (ref 79–97)
MONOCYTES # BLD AUTO: 0.6 10*3/MM3 (ref 0.1–0.9)
MONOCYTES NFR BLD AUTO: 7.7 % (ref 5–12)
NEUTROPHILS NFR BLD AUTO: 5.1 10*3/MM3 (ref 1.7–7)
NEUTROPHILS NFR BLD AUTO: 71.5 % (ref 42.7–76)
NITRITE UR QL STRIP: NEGATIVE
NRBC BLD AUTO-RTO: 0 /100 WBC (ref 0–0.2)
PH UR STRIP.AUTO: 5.5 [PH] (ref 5–8)
PLATELET # BLD AUTO: 295 10*3/MM3 (ref 140–450)
PMV BLD AUTO: 7.6 FL (ref 6–12)
POTASSIUM SERPL-SCNC: 4.4 MMOL/L (ref 3.5–5.2)
PROT SERPL-MCNC: 6.5 G/DL (ref 6–8.5)
PROT UR QL STRIP: NEGATIVE
RBC # BLD AUTO: 4.51 10*6/MM3 (ref 3.77–5.28)
RBC # UR STRIP: ABNORMAL /HPF
REF LAB TEST METHOD: ABNORMAL
SODIUM SERPL-SCNC: 137 MMOL/L (ref 136–145)
SP GR UR STRIP: 1.01 (ref 1–1.03)
SQUAMOUS #/AREA URNS HPF: ABNORMAL /HPF
UROBILINOGEN UR QL STRIP: ABNORMAL
WBC # UR STRIP: ABNORMAL /HPF
WBC NRBC COR # BLD: 7.2 10*3/MM3 (ref 3.4–10.8)

## 2023-04-10 PROCEDURE — 25510000001 IOPAMIDOL PER 1 ML: Performed by: EMERGENCY MEDICINE

## 2023-04-10 PROCEDURE — 83690 ASSAY OF LIPASE: CPT | Performed by: PHYSICIAN ASSISTANT

## 2023-04-10 PROCEDURE — 96375 TX/PRO/DX INJ NEW DRUG ADDON: CPT

## 2023-04-10 PROCEDURE — 25010000002 ONDANSETRON PER 1 MG: Performed by: PHYSICIAN ASSISTANT

## 2023-04-10 PROCEDURE — 74176 CT ABD & PELVIS W/O CONTRAST: CPT

## 2023-04-10 PROCEDURE — 81001 URINALYSIS AUTO W/SCOPE: CPT | Performed by: PHYSICIAN ASSISTANT

## 2023-04-10 PROCEDURE — 80053 COMPREHEN METABOLIC PANEL: CPT | Performed by: PHYSICIAN ASSISTANT

## 2023-04-10 PROCEDURE — 96361 HYDRATE IV INFUSION ADD-ON: CPT

## 2023-04-10 PROCEDURE — 71260 CT THORAX DX C+: CPT

## 2023-04-10 PROCEDURE — 96374 THER/PROPH/DIAG INJ IV PUSH: CPT

## 2023-04-10 PROCEDURE — 25010000002 MORPHINE PER 10 MG: Performed by: PHYSICIAN ASSISTANT

## 2023-04-10 PROCEDURE — 99284 EMERGENCY DEPT VISIT MOD MDM: CPT

## 2023-04-10 PROCEDURE — 85025 COMPLETE CBC W/AUTO DIFF WBC: CPT | Performed by: PHYSICIAN ASSISTANT

## 2023-04-10 RX ORDER — TRAMADOL HYDROCHLORIDE 50 MG/1
50 TABLET ORAL EVERY 6 HOURS PRN
Qty: 12 TABLET | Refills: 0 | Status: SHIPPED | OUTPATIENT
Start: 2023-04-10

## 2023-04-10 RX ORDER — SODIUM CHLORIDE 0.9 % (FLUSH) 0.9 %
10 SYRINGE (ML) INJECTION AS NEEDED
Status: DISCONTINUED | OUTPATIENT
Start: 2023-04-10 | End: 2023-04-10 | Stop reason: HOSPADM

## 2023-04-10 RX ORDER — MORPHINE SULFATE 2 MG/ML
2 INJECTION, SOLUTION INTRAMUSCULAR; INTRAVENOUS ONCE
Status: COMPLETED | OUTPATIENT
Start: 2023-04-10 | End: 2023-04-10

## 2023-04-10 RX ORDER — ONDANSETRON 4 MG/1
4 TABLET, ORALLY DISINTEGRATING ORAL EVERY 8 HOURS PRN
Qty: 20 TABLET | Refills: 0 | Status: SHIPPED | OUTPATIENT
Start: 2023-04-10

## 2023-04-10 RX ORDER — ONDANSETRON 2 MG/ML
4 INJECTION INTRAMUSCULAR; INTRAVENOUS ONCE
Status: COMPLETED | OUTPATIENT
Start: 2023-04-10 | End: 2023-04-10

## 2023-04-10 RX ADMIN — ONDANSETRON 4 MG: 2 INJECTION INTRAMUSCULAR; INTRAVENOUS at 09:26

## 2023-04-10 RX ADMIN — IOPAMIDOL 100 ML: 755 INJECTION, SOLUTION INTRAVENOUS at 11:24

## 2023-04-10 RX ADMIN — MORPHINE SULFATE 2 MG: 2 INJECTION, SOLUTION INTRAMUSCULAR; INTRAVENOUS at 09:26

## 2023-04-10 RX ADMIN — SODIUM CHLORIDE 500 ML: 9 INJECTION, SOLUTION INTRAVENOUS at 11:14

## 2023-04-10 NOTE — DISCHARGE INSTRUCTIONS
Take pain medicine as directed.  Do not operate heavy machinery or drink alcohol while taking this medication.  Be aware this medication may cause constipation.    Take Zofran as needed for nausea.  Follow-up with your primary care provider in 3-5 days.  If you do not have a primary care provider call [phone] for help in finding one, or you may follow up with [facility] at [phone].    Return to ED for any new or worsening

## 2023-04-10 NOTE — ED PROVIDER NOTES
"Subjective   History of Present Illness  Patient is a 78-year-old female presents to the ED with complaints of acute severe abdominal pain for the past 1 week 3 days.  Patient states her pain initially started after twisting at her torso about 2 weeks ago early in the morning when she was getting water.  She states that initially caused burning type pain that \"brought me to my knees\" patient states since then she has had intermittent pain that has become progressively more severe and constant.  She states that initially started in her epigastric region and then became more localized in her right upper quadrant and into her flank.  She states it is worse with certain movements and lifting heavy objects better with rest.  Patient states she did see her PCP last week initially thought she had costochondritis and gave her dexamethasone with no improvement.  Patient states she also saw [facility] last week and is getting scheduled for a colonoscopy in June.  Patient denies any change in bladder or bowel habits.  She denies any black or bloody stools.  She reports some associated nausea at times no vomiting or fever.  Denies any significant chest pain or shortness of breath.  Currently rates it as moderate in severity.  Denies any other alleviating or exacerbating factors    History provided by:  Patient      Review of Systems   Constitutional: Negative.    HENT: Negative.    Eyes: Negative for photophobia and visual disturbance.   Respiratory: Negative.    Cardiovascular: Negative.    Gastrointestinal: Positive for abdominal pain and nausea. Negative for abdominal distention, blood in stool, constipation, diarrhea and vomiting.   Genitourinary: Positive for flank pain. Negative for decreased urine volume, difficulty urinating, dysuria, hematuria, urgency, vaginal bleeding, vaginal discharge and vaginal pain.   Musculoskeletal: Negative for back pain, neck pain and neck stiffness.   Skin: Negative.    Neurological: Negative.  "   Hematological: Negative.        Past Medical History:   Diagnosis Date   • Allergic    • Allergies    • Arthritis    • Asthma    • Kidney stones    • Osteopenia    • Osteoporosis        Allergies   Allergen Reactions   • Penicillin G Rash   • Other Other (See Comments) and Confusion     Metachlopramide and     Anesthesia Meds             Past Surgical History:   Procedure Laterality Date   • CHOLECYSTECTOMY     • TONSILLECTOMY         No family history on file.    Social History     Socioeconomic History   • Marital status:    Tobacco Use   • Smoking status: Never   Substance and Sexual Activity   • Alcohol use: Never           Objective   Physical Exam  Vitals and nursing note reviewed.   Constitutional:       General: She is not in acute distress.     Appearance: She is well-developed. She is not ill-appearing, toxic-appearing or diaphoretic.   HENT:      Head: Normocephalic and atraumatic.      Mouth/Throat:      Mouth: Mucous membranes are moist.      Pharynx: Oropharynx is clear.   Eyes:      General: No scleral icterus.     Extraocular Movements: Extraocular movements intact.      Pupils: Pupils are equal, round, and reactive to light.   Cardiovascular:      Rate and Rhythm: Normal rate and regular rhythm.      Heart sounds: No murmur heard.    No friction rub. No gallop.   Pulmonary:      Effort: Pulmonary effort is normal. No respiratory distress.      Breath sounds: Normal breath sounds. No stridor. No wheezing, rhonchi or rales.   Chest:      Chest wall: No tenderness.   Abdominal:      General: Bowel sounds are normal. There is no distension. There are no signs of injury.      Palpations: Abdomen is soft.      Tenderness: There is no abdominal tenderness. There is right CVA tenderness. There is no left CVA tenderness, guarding or rebound.      Hernia: No hernia is present.      Comments: Patient reports epigastric abdominal pain however she is nontender on exam   Skin:     General: Skin is warm.  "     Capillary Refill: Capillary refill takes less than 2 seconds.      Coloration: Skin is not cyanotic, jaundiced or pale.      Findings: No rash.   Neurological:      General: No focal deficit present.      Mental Status: She is alert and oriented to person, place, and time.      GCS: GCS eye subscore is 4. GCS verbal subscore is 5. GCS motor subscore is 6.   Psychiatric:         Mood and Affect: Mood normal.         Behavior: Behavior normal.         Procedures           ED Course  ED Course as of 04/10/23 2056   Mon Apr 10, 2023   1034 Labs were discussed with the patient and family at bedside will add on CT chest due to abnormality noted on CT abdomen and pelvis and right lower lobe.  Patient was in agreement with this plan.  Offered more pain medicine but declined. [AA]      ED Course User Index  [AA] Daniel Vernon PA      /61   Pulse 73   Temp 98 °F (36.7 °C)   Resp 18   Ht 162.6 cm (64\")   Wt 63 kg (139 lb)   SpO2 97%   BMI 23.86 kg/m²   Medications   morphine injection 2 mg (2 mg Intravenous Given 4/10/23 0926)   ondansetron (ZOFRAN) injection 4 mg (4 mg Intravenous Given 4/10/23 0926)   sodium chloride 0.9 % bolus 500 mL (0 mL Intravenous Stopped 4/10/23 1214)   iopamidol (ISOVUE-370) 76 % injection 100 mL (100 mL Intravenous Given 4/10/23 1124)     Labs Reviewed   COMPREHENSIVE METABOLIC PANEL - Abnormal; Notable for the following components:       Result Value    Creatinine 1.05 (*)     eGFR 54.5 (*)     All other components within normal limits    Narrative:     GFR Normal >60  Chronic Kidney Disease <60  Kidney Failure <15    The GFR formula is only valid for adults with stable renal function between ages 18 and 70.   URINALYSIS W/ CULTURE IF INDICATED - Abnormal; Notable for the following components:    Leuk Esterase, UA Trace (*)     All other components within normal limits    Narrative:     In absence of clinical symptoms, the presence of pyuria, bacteria, and/or nitrites on " the urinalysis result does not correlate with infection.   CBC WITH AUTO DIFFERENTIAL - Abnormal; Notable for the following components:    Lymphocyte % 18.2 (*)     All other components within normal limits   URINALYSIS, MICROSCOPIC ONLY - Abnormal; Notable for the following components:    RBC, UA 0-2 (*)     WBC, UA 0-2 (*)     All other components within normal limits   LIPASE - Normal   CBC AND DIFFERENTIAL    Narrative:     The following orders were created for panel order CBC & Differential.  Procedure                               Abnormality         Status                     ---------                               -----------         ------                     CBC Auto Differential[[id number]]        Abnormal            Final result                 Please view results for these tests on the individual orders.     CT Abdomen Pelvis Without Contrast    Result Date: 4/10/2023  Impression: 1. Extensive sigmoid diverticulosis without diverticulitis. 2. Age-indeterminate L3 compression with 50% loss of height. 3. No cause for right flank pain or epigastric pain is identified on this exam. 4. There is mild motion artifact and a partially visualized 7 mm nodular density in the right lower lung. This may be due to artifact and/or crowded pulmonary vessels but pathology cannot be excluded. A nonemergent chest CT with contrast when feasible would be better able to evaluate this area. Electronically Signed: Domo Hendrickson  4/10/2023 10:12 AM EDT  Workstation ID: [id number]    CT Chest With Contrast Diagnostic    Result Date: 4/10/2023  Impression: 1. 8 mm calcified granulomatous nodule is seen in the right middle lobe with smaller adjacent satellite nodules, consistent with benign granulomatous process. This corresponds to the questioned abnormality on prior CT abdomen. 2. No acute chest findings. 3. Sclerotic foci within the left scapular body and T1 vertebral body are nonspecific. In absence of history of malignancy,  benign etiology such as bone islands are favored. 4. Chronic thoracic and lumbar compression fractures. Electronically Signed: Elisabet Diamond  4/10/2023 11:43 AM EDT  Workstation ID: [id number]                                         Medical Decision Making  Chart Review: Progress note reviewed from November 2020 with physical therapy    Comorbidity: As per past medical history  Differentials: Obstructing kidney stone, muscle strain, intra-abdominal infection, bowel obstruction      ;this list is not all inclusive and does not constitute the entirety of considered causes  Labs: As above  Radiology: My interpretation CT abdomen pelvis shows no obstructing kidney stone or acute bowel obstruction.  CT chest showed no acute pneumothorax correlated with radiologist interpretations as below  CT Chest With Contrast Diagnostic   Final Result    Impression:    1. 8 mm calcified granulomatous nodule is seen in the right middle lobe with smaller adjacent satellite nodules, consistent with benign granulomatous process. This corresponds to the questioned abnormality on prior CT abdomen.    2. No acute chest findings.    3. Sclerotic foci within the left scapular body and T1 vertebral body are nonspecific. In absence of history of malignancy, benign etiology such as bone islands are favored.    4. Chronic thoracic and lumbar compression fractures.        Electronically Signed: Elisabet Diamond      4/10/2023 11:43 AM EDT      Workstation ID: [id number]     CT Abdomen Pelvis Without Contrast   Final Result    Impression:        1. Extensive sigmoid diverticulosis without diverticulitis.    2. Age-indeterminate L3 compression with 50% loss of height.        3. No cause for right flank pain or epigastric pain is identified on this exam.        4. There is mild motion artifact and a partially visualized 7 mm nodular density in the right lower lung. This may be due to artifact and/or crowded pulmonary vessels but pathology cannot be excluded.  A nonemergent chest CT with contrast when feasible     would be better able to evaluate this area.        Electronically Signed: Domo Hendrickson      4/10/2023 10:12 AM EDT      Workstation ID: [id number]       Disposition/Treatment:  Appropriate PPE was worn during exam and throughout all encounters with the patient.  While ED patient was afebrile and appeared nontoxic presented with complaints of acute severe abdominal pain for the past week.    IV was placed and labs were obtained patient was placed on proper monitor she was given morphine and Zofran for her pain upon reassessment she is resting much more comfortably does report improvement of her pain.  She had presented with complaints of abdominal pain radiating into her back.  She denies any significant chest pain or shortness of breath or urinary complaints.    Lab results showed a normal white count occasion was hemodynamically stable.  Metabolic panel showed creatinine 105 otherwise unremarkable.  Lipase normal at 47.  Urinalysis unremarkable for UTI.  She did have some flank tenderness on exam therefore CT abdomen and pelvis without contrast was ordered for further assessment which showed  Extensive sigmoid diverticulosis without diverticulitis. Age-indeterminate L3 compression with 50% loss of height. (Patient denies any significant low back pain this compression fracture was discussed with the patient she denies any recent injury she believes this is likely chronic she does have a history of osteoporosis no saddle anesthesia or bladder bowel incontinence or other red flag signs to warrant additional work-up of this fracture at this time)  There is mild motion artifact and a partially visualized 7 mm nodular density in the right lower lung. This may be due to artifact and/or crowded pulmonary vessels but pathology cannot be excluded.  Due to possible abnormality noted on CT abdomen pelvis CT chest was ordered while in the ED as she states she does have some  pain in her upper abdomen.  Though she could not localize it well.  CT chest as above shows no acute chest findings does show 8 mm calcified granulomatous nodule is seen in the right middle lobe with smaller adjacent satellite nodules, consistent with benign granulomatous process. This corresponds to the questioned abnormality on prior CT abdomen.     Upon reassessment patient is resting quietly findings were discussed with the patient and family at bedside she was offered placement in observation unit for further pain control and work-up of her abdominal pain but declined.  I do believe she may have a muscle skeletal component of her pain as it is worse with certain movements she cannot really localize it well today but there is no signs of acute infection or obstruction on work-up in the ED.  Inspect was queried she was given tramadol and Zofran for home advised to follow-up with her PCP further evaluation management of her abdominal pain if it continues.  She was in agreement with this plan all questions were answered.    This document is intended for medical expert use only. Reading of this document by patients and/or patient's family without participating medical staff guidance may result in misinterpretation and unintended morbidity.  Any interpretation of such data is the responsibility of the patient and/or family member responsible for the patient in concert with their primary or specialist providers, not to be left for sources of online searches such as [employer], [employer] or similar queries. Relying on these approaches to knowledge may result in misinterpretation, misguided goals of care and even death should patients or family members try recommendations outside of the realm of professional medical care in a supervised inpatient environment.       Compression fracture of lumbar vertebra, unspecified lumbar vertebral level, initial encounter: acute illness or injury  Compression fracture of thoracic vertebra,  unspecified thoracic vertebral level, initial encounter: acute illness or injury  Pain of upper abdomen: acute illness or injury  Pulmonary nodule: acute illness or injury  Amount and/or Complexity of Data Reviewed  External Data Reviewed: notes.  Labs: ordered. Decision-making details documented in ED Course.  Radiology: ordered. Decision-making details documented in ED Course.      Risk  Prescription drug management.          Final diagnoses:   Pain of upper abdomen   Pulmonary nodule   Compression fracture of thoracic vertebra, unspecified thoracic vertebral level, initial encounter   Compression fracture of lumbar vertebra, unspecified lumbar vertebral level, initial encounter       ED Disposition  ED Disposition     ED Disposition   Discharge    Condition   Stable    Comment   --             Sonia De La Torre MD  [address] [facility] [address]  [address]  [place] [id number] [phone]    Schedule an appointment as soon as possible for a visit in 3 days      [facility] EMERGENCY DEPARTMENT  [address] [address] [phone]  Go to   If symptoms worsen    Tan Ceron MD  [address] [facility] [place] [address] [phone]    Schedule an appointment as soon as possible for a visit            Medication List      New Prescriptions    ondansetron ODT 4 MG disintegrating tablet  Commonly known as: ZOFRAN-ODT  Place 1 tablet on the tongue Every 8 (Eight) Hours As Needed for Nausea or Vomiting.     traMADol 50 MG tablet  Commonly known as: ULTRAM  Take 1 tablet by mouth Every 6 (Six) Hours As Needed for Moderate Pain.           Where to Get Your Medications      These medications were sent to [id number] [place], [place] - [address] [facility] AT [facility] - [phone] [facility] [phone]   [address] [place] [address]    Phone: [phone]   · ondansetron ODT 4 MG disintegrating tablet  · traMADol 50 MG tablet           Daniel Vernon PA  04/10/23 2056

## 2023-06-07 ENCOUNTER — ON CAMPUS - OUTPATIENT (AMBULATORY)
Dept: URBAN - METROPOLITAN AREA HOSPITAL 2 | Facility: HOSPITAL | Age: 78
End: 2023-06-07
Payer: COMMERCIAL

## 2023-06-07 VITALS
HEART RATE: 108 BPM | OXYGEN SATURATION: 99 % | OXYGEN SATURATION: 98 % | DIASTOLIC BLOOD PRESSURE: 78 MMHG | RESPIRATION RATE: 18 BRPM | SYSTOLIC BLOOD PRESSURE: 159 MMHG | TEMPERATURE: 97.8 F | DIASTOLIC BLOOD PRESSURE: 72 MMHG | HEART RATE: 88 BPM | DIASTOLIC BLOOD PRESSURE: 65 MMHG | WEIGHT: 140 LBS | HEIGHT: 64 IN | HEART RATE: 92 BPM | SYSTOLIC BLOOD PRESSURE: 174 MMHG | RESPIRATION RATE: 16 BRPM | SYSTOLIC BLOOD PRESSURE: 149 MMHG | OXYGEN SATURATION: 100 % | DIASTOLIC BLOOD PRESSURE: 79 MMHG | SYSTOLIC BLOOD PRESSURE: 131 MMHG | HEART RATE: 77 BPM

## 2023-06-07 DIAGNOSIS — R10.13 EPIGASTRIC PAIN: ICD-10-CM

## 2023-06-07 DIAGNOSIS — K44.9 DIAPHRAGMATIC HERNIA WITHOUT OBSTRUCTION OR GANGRENE: ICD-10-CM

## 2023-06-07 DIAGNOSIS — K21.9 GASTRO-ESOPHAGEAL REFLUX DISEASE WITHOUT ESOPHAGITIS: ICD-10-CM

## 2023-06-07 DIAGNOSIS — R13.10 DYSPHAGIA, UNSPECIFIED: ICD-10-CM

## 2023-06-07 PROCEDURE — 43235 EGD DIAGNOSTIC BRUSH WASH: CPT | Performed by: INTERNAL MEDICINE

## 2023-06-07 PROCEDURE — 43450 DILATE ESOPHAGUS 1/MULT PASS: CPT | Performed by: INTERNAL MEDICINE
